# Patient Record
Sex: FEMALE | Race: WHITE | NOT HISPANIC OR LATINO | URBAN - METROPOLITAN AREA
[De-identification: names, ages, dates, MRNs, and addresses within clinical notes are randomized per-mention and may not be internally consistent; named-entity substitution may affect disease eponyms.]

---

## 2018-09-20 ENCOUNTER — COMMUNICATION - HEALTHEAST (OUTPATIENT)
Dept: MIDWIFE SERVICES | Facility: CLINIC | Age: 28
End: 2018-09-20

## 2018-09-20 ENCOUNTER — RECORDS - HEALTHEAST (OUTPATIENT)
Dept: ADMINISTRATIVE | Facility: OTHER | Age: 28
End: 2018-09-20

## 2018-09-20 RX ORDER — ACETAMINOPHEN 325 MG/1
325-650 TABLET ORAL
Status: SHIPPED | COMMUNITY
Start: 2018-09-03

## 2021-06-20 NOTE — PROGRESS NOTES
"Assessment:   1.  2 1/2 week old infant at one ounce below birthweight before feeding (although was 1.5 oz above birthweight at pediatric visit yesterday)  2.  Nipple pain resolved after assistance with positioning  3.  Good latch, suck and milk transfer in office today    Plan:   1. Advised to continue to nurse baby on cue, 8-12 times each day.  Feed on one side until baby finishes swallowing.  Once swallowing slows, use breast compression to encourage more swallowing, but once there is no more active swallowing, and baby is either sleeping, coming off the breast, or just \"nibbling,\" it is OK to use a finger to take baby off the breast and move to the other breast.  Do the same on the other side.  Offer both breasts at each feeding.  It is more important to watch the baby than the clock!   2.  Demonstrated how to present breast in the \"sandwich\" hold, compressing breast vertically and in line with baby's mouth, for baby to get a larger mouthful of breast and a deeper latch.  If there is pinching or pain, stop, use finger to break the suction, remove baby from breast and try again until there is no pain with nursing.  There is sometimes a little pain when the baby first begins sucking, but after the first few seconds there should be no pain--only a tugging feeling.  To not continue with the same position if nursing is painful;  Always restart!    3.  Samuel Galvez needs about 20-21 oz of milk each day to grow well.  If he nurses at home as he did in the office today, about 10-12 times/day, this should be enough for him to grow well.  Offered lactation re-visit for weight check--will call if visit desired.  4. Demonstrated alternative positions for nursing, such as sidelying, laid-back, cross cradle and football positions.  Advised that laid back position will help a bit with strong letdown, as will use of gentle pressure on breast to help slow the flow.  Given video resource on laid-back position.  5.  Can use " "extra-virgin coconut oil on nipples for tenderness.    6.  Reviewed supply/demand system of breastmilk supply, and reassured Charlee she does not need to pump multiple times/day at this point to maintain supply, as frequent nursing is adequate stimulation.  Can pump as needed/desired for convenience bottles.  6.  Follow up with lactation as needed.    Subjective: Charlee and Isael are here today because of nipple pain--she states that during first week, her nipples were cracked and bleeding (baby spit up blood at one point), and they have improved from that point, but are still painful.  Right nipple \"burns\" all the time, especially during feedings but also between feedings.  Her left nipple is just painful between feedings.  She did try using nipple shields briefly for pain, but then got a plugged duct on her left side, so stopped using the shield.  Plugged area resolved with heat, massage and frequent feeding.  She has strong letdown of milk, which sometimes causes baby Lenny to to pull off and cough/choke.  He is also very gassy.  Finally, feedings often last a long time, as Charlee is unsure of how to know when baby is done feeding--Lenny often seems to want to suck.  She has been pumping twice a day, because \"they tell you to pump after every feeding so you have enough,\" but is not sure why this is important.    Breastfeeding Goals:  Continued exclusive breastfeeding--unsure for how long    Previous Breastfeeding Experience:  None    Infants name: Lenny Zambrano's bday: [birth year]  Gestational age: 39  Infant's birth weight: 7# 8 oz      Mode of delivery: vacuum assisted vaginal, at [facility] MD: Dr. Mattson, [facility].  Charlee gives her permission for today's note to be sent to Dr. Mattson.  WERNER signed and filed in Charlee's chart as Lenny has no active pediatric chart at .  Discharge weight: unsure    Frequency and duration of feedings: every 1 1/2 - 2 hours, sometimes 3 hours at night  Swallows " audible per mother: yes  Numbers of feedings in 24 hours: 11-12  Number urines per day: 6-7  Number of stools per day and their color: 5-6, yellow    Supplementation: none   Pumping: twice daily    Objective/Physical exam:     Mother: Noticed breasts grew larger and areolas darkened during pregnancy and she noticed primary engorgement when her milk came in.     Her nipples are everted, the areola is compressible, the breast is soft and full.  Nipples are intact bilaterally    Sore nipples: yes   EPDS: 10    Assessment of infant:    Age today: 18 days  Today's weight: 7# 7 oz  Amount of milk transferred from LEFT side: 1 oz  Amount of milk transferred from RIGHT side: 1 oz    Baby has full flexion of arms and legs, normal tone, behavior is alert and active, respirations are normal, skin is normal, hydration is normal, jaw is normal size and alignment, palate is normal, frenulum is normal, baby can lateralize tongue, has adequate tongue lift, and tongue can protrude tongue past bottom gum line.    Baby thrush: none      Jaundice: none      Feeding assessment: Baby can hold suction with tongue while at the breast.     Alignment: The baby was flex relaxed. Baby's head was aligned with its trunk. Baby did face mother. Baby was in cradle hold on left, and laid-back position on right side today. Laid-back position allowed deeper latch and much more comfort on right side.    Areolar Grasp: Baby was able to open mouth wide. Baby's lips were not pursed. Baby's lips did flange outward. Tongue was visible just barely over bottom lip. Baby had complete seal.     Areolar Compression: Baby made rhythmic motion. There were no clicking or smacking sounds. There was no severe nipple discomfort.  Nipples appeared rounded after feeding.    Audible swallowing: Baby made quiet sounds of swallowing: There was an increase in frequency after milk ejection reflex. The milk ejection reflex is normal and milk supply is normal.       Current  Outpatient Prescriptions:      acetaminophen (TYLENOL) 325 MG tablet, Take 325-650 mg by mouth., Disp: , Rfl:   Past Medical History:   Diagnosis Date     Urinary tract infection      Varicella      Past Surgical History:   Procedure Laterality Date     NO PAST SURGERIES       Family History   Problem Relation Age of Onset     No Medical Problems Mother      Hypertension Father      No Medical Problems Brother      /70  Breastfeeding? Yes    TT 45 min >50% counseling and education  Maru Colvin, NOHELIA, CNM, IBCLC